# Patient Record
Sex: MALE | Race: WHITE | Employment: STUDENT | URBAN - NONMETROPOLITAN AREA
[De-identification: names, ages, dates, MRNs, and addresses within clinical notes are randomized per-mention and may not be internally consistent; named-entity substitution may affect disease eponyms.]

---

## 2024-09-30 ENCOUNTER — HOSPITAL ENCOUNTER (OUTPATIENT)
Age: 19
Setting detail: SPECIMEN
Discharge: HOME | End: 2024-09-30
Payer: COMMERCIAL

## 2024-09-30 ENCOUNTER — OFFICE VISIT (OUTPATIENT)
Age: 19
End: 2024-09-30

## 2024-09-30 VITALS
OXYGEN SATURATION: 98 % | TEMPERATURE: 98.7 F | HEART RATE: 76 BPM | WEIGHT: 116 LBS | DIASTOLIC BLOOD PRESSURE: 56 MMHG | SYSTOLIC BLOOD PRESSURE: 111 MMHG

## 2024-09-30 DIAGNOSIS — N50.811 PAIN IN RIGHT TESTICLE: ICD-10-CM

## 2024-09-30 DIAGNOSIS — N50.811 PAIN IN RIGHT TESTICLE: Primary | ICD-10-CM

## 2024-09-30 PROCEDURE — 87591 N.GONORRHOEAE DNA AMP PROB: CPT

## 2024-09-30 PROCEDURE — 87086 URINE CULTURE/COLONY COUNT: CPT

## 2024-09-30 PROCEDURE — 87491 CHLMYD TRACH DNA AMP PROBE: CPT

## 2024-09-30 ASSESSMENT — ENCOUNTER SYMPTOMS
GASTROINTESTINAL NEGATIVE: 1
EYES NEGATIVE: 1
RESPIRATORY NEGATIVE: 1

## 2024-09-30 NOTE — PROGRESS NOTES
[facility] PHYSIC[facility]ANS [facility]  BOBBY TODD [address] [place], [place] [id number]  Dept: [phone]  Dept Fax: [phone]    Marco Gibson is a 18 y.o. malewho presents to the [facility] today for c/o of No chief complaint on file.      HPI:     18-year-old male presents to [facility] today with complaints of right testicle pain for one week.  He denies any trauma or injury.  Denies fever.  Denies abdominal pain.  Denies dysuria.  Denies any genital sores or lesions.        No past medical history on file.    No current outpatient medications on file.     No current facility-administered medications for this visit.     No Known Allergies    :     Review of Systems   Constitutional: Negative.    HENT: Negative.     Eyes: Negative.    Respiratory: Negative.     Cardiovascular: Negative.    Gastrointestinal: Negative.    Genitourinary:  Positive for testicular pain (right).   Musculoskeletal: Negative.    Skin: Negative.    Neurological: Negative.    Psychiatric/Behavioral: Negative.         :     Physical Exam  Vitals and nursing note reviewed.   Constitutional:       General: He is not in acute distress.     Appearance: He is well-developed. He is not ill-appearing or toxic-appearing.   HENT:      Head: Normocephalic and atraumatic.      Right Ear: External ear normal.      Left Ear: External ear normal.      Nose: Nose normal.   Cardiovascular:      Rate and Rhythm: Normal rate and regular rhythm.      Heart sounds: Normal heart sounds. No murmur heard.     No friction rub. No gallop.   Pulmonary:      Effort: Pulmonary effort is normal. No respiratory distress.      Breath sounds: Normal breath sounds. No stridor. No wheezing, rhonchi or rales.   Abdominal:      General: Bowel sounds are normal. There is no distension.      Palpations: Abdomen is soft. There is no mass.      Tenderness: There is no abdominal

## 2024-10-01 LAB
CHLAMYDIA DNA UR QL NAA+PROBE: NEGATIVE
MICROORGANISM SPEC CULT: NO GROWTH
N GONORRHOEA DNA UR QL NAA+PROBE: NEGATIVE
SERVICE CMNT-IMP: NORMAL
SPECIMEN DESCRIPTION: NORMAL
SPECIMEN DESCRIPTION: NORMAL

## 2024-10-02 ENCOUNTER — HOSPITAL ENCOUNTER (OUTPATIENT)
Dept: ULTRASOUND IMAGING | Age: 19
Discharge: HOME | End: 2024-10-04
Payer: COMMERCIAL

## 2024-10-02 DIAGNOSIS — N50.811 PAIN IN RIGHT TESTICLE: ICD-10-CM

## 2024-10-02 PROCEDURE — 93976 VASCULAR STUDY: CPT

## 2024-11-18 ENCOUNTER — OFFICE VISIT (OUTPATIENT)
Age: 19
End: 2024-11-18

## 2024-11-18 ENCOUNTER — HOSPITAL ENCOUNTER (OUTPATIENT)
Age: 19
Setting detail: SPECIMEN
Discharge: HOME | End: 2024-11-18
Payer: COMMERCIAL

## 2024-11-18 VITALS
SYSTOLIC BLOOD PRESSURE: 123 MMHG | OXYGEN SATURATION: 99 % | HEART RATE: 81 BPM | TEMPERATURE: 97.6 F | DIASTOLIC BLOOD PRESSURE: 73 MMHG

## 2024-11-18 DIAGNOSIS — L02.92 BOIL: ICD-10-CM

## 2024-11-18 DIAGNOSIS — L02.92 BOIL: Primary | ICD-10-CM

## 2024-11-18 PROCEDURE — 87070 CULTURE OTHR SPECIMN AEROBIC: CPT

## 2024-11-18 PROCEDURE — 87077 CULTURE AEROBIC IDENTIFY: CPT

## 2024-11-18 PROCEDURE — 87186 SC STD MICRODIL/AGAR DIL: CPT

## 2024-11-18 PROCEDURE — 86403 PARTICLE AGGLUT ANTBDY SCRN: CPT

## 2024-11-18 PROCEDURE — 87205 SMEAR GRAM STAIN: CPT

## 2024-11-18 RX ORDER — SULFAMETHOXAZOLE AND TRIMETHOPRIM 800; 160 MG/1; MG/1
1 TABLET ORAL 2 TIMES DAILY
Qty: 20 TABLET | Refills: 0 | Status: SHIPPED | OUTPATIENT
Start: 2024-11-18 | End: 2024-11-28

## 2024-11-18 ASSESSMENT — ENCOUNTER SYMPTOMS
RESPIRATORY NEGATIVE: 1
GASTROINTESTINAL NEGATIVE: 1
EYES NEGATIVE: 1

## 2024-11-18 NOTE — PROGRESS NOTES
[facility] PHYSIC[facility]ANS [facility]  BOBBY TODD [address] [place] [address]  Dept: [phone]  Dept Fax: [phone]    Marco Gibson is a 18 y.o. malewho presents to the [facility] today for c/o of Other (Cyst on thumb)      HPI:     18-year-old male presents to [facility] today with complaints of a cyst on his left thumb. Reports he noticed it one week ago.  He states he has left thumb pain with playing video games, playing the piano and playing the guitar.  He has no known injury.  No known fever.        No past medical history on file.    Current Outpatient Medications   Medication Sig Dispense Refill    sulfamethoxazole-trimethoprim (BACTRIM DS;SEPTRA DS) 800-160 MG per tablet Take 1 tablet by mouth 2 times daily for 10 days 20 tablet 0     No current facility-administered medications for this visit.     No Known Allergies    :     Review of Systems   Constitutional: Negative.    HENT: Negative.     Eyes: Negative.    Respiratory: Negative.     Cardiovascular: Negative.    Gastrointestinal: Negative.    Genitourinary: Negative.    Musculoskeletal: Negative.    Skin:  Positive for wound.        Cyst left thumb   Neurological: Negative.    Psychiatric/Behavioral: Negative.         :     Physical Exam  Vitals and nursing note reviewed.   Constitutional:       General: He is not in acute distress.     Appearance: He is well-developed. He is not ill-appearing or toxic-appearing.   HENT:      Head: Normocephalic and atraumatic.      Right Ear: External ear normal.      Left Ear: External ear normal.      Nose: Nose normal.      Mouth/Throat:      Mouth: Mucous membranes are moist.   Eyes:      Conjunctiva/sclera: Conjunctivae normal.   Cardiovascular:      Rate and Rhythm: Normal rate and regular rhythm.      Heart sounds: Normal heart sounds. No murmur heard.     No friction rub. No gallop.   Pulmonary:

## 2024-11-20 ENCOUNTER — OFFICE VISIT (OUTPATIENT)
Age: 19
End: 2024-11-20

## 2024-11-20 VITALS
SYSTOLIC BLOOD PRESSURE: 112 MMHG | OXYGEN SATURATION: 97 % | TEMPERATURE: 97.3 F | HEART RATE: 80 BPM | DIASTOLIC BLOOD PRESSURE: 85 MMHG

## 2024-11-20 DIAGNOSIS — L02.92 BOIL: Primary | ICD-10-CM

## 2024-11-20 ASSESSMENT — ENCOUNTER SYMPTOMS
GASTROINTESTINAL NEGATIVE: 1
ROS SKIN COMMENTS: LEFT THUMB
RESPIRATORY NEGATIVE: 1
EYES NEGATIVE: 1

## 2024-11-20 NOTE — PROGRESS NOTES
[facility] PHYSICIANS [facility]  BOBBY TODD [address] [id number]  Dept: [phone]  Dept Fax: [phone]    Marco Gibson is a 18 y.o. malewho presents to the [facility] today for c/o of Follow-up (Thumb cyst )      HPI:        18-year-old male presents to [facility] clinic today for follow up boil to left thumb.  He reports he is feeling better . He was seen in clinic two days ago with complaints of a cyst on his left thumb. Reports he noticed it one week ago.  He states he has left thumb pain with playing video games, playing the piano and playing the guitar.  He has no known injury.  No known fever.  He continues taking Bactrim orally.  Wound culture had been sent with no growth.  He verbalizes he is feeling better.  No pain.  Less discomfort.        No past medical history on file.    Current Outpatient Medications   Medication Sig Dispense Refill    sulfamethoxazole-trimethoprim (BACTRIM DS;SEPTRA DS) 800-160 MG per tablet Take 1 tablet by mouth 2 times daily for 10 days 20 tablet 0     No current facility-administered medications for this visit.     No Known Allergies    :     Review of Systems   Constitutional: Negative.    HENT: Negative.     Eyes: Negative.    Respiratory: Negative.     Cardiovascular: Negative.    Gastrointestinal: Negative.    Genitourinary: Negative.    Musculoskeletal: Negative.    Skin:  Positive for rash.        Left thumb   Neurological: Negative.    Psychiatric/Behavioral: Negative.         :     Physical Exam  Vitals and nursing note reviewed.   Constitutional:       General: He is not in acute distress.     Appearance: He is well-developed. He is not ill-appearing or toxic-appearing.   HENT:      Head: Normocephalic and atraumatic.      Right Ear: External ear normal.      Left Ear: External ear normal.      Nose: Nose normal.   Eyes:      Conjunctiva/sclera:

## 2024-11-21 LAB
MICROORGANISM SPEC CULT: ABNORMAL
MICROORGANISM/AGENT SPEC: ABNORMAL
MICROORGANISM/AGENT SPEC: ABNORMAL
SPECIMEN DESCRIPTION: ABNORMAL